# Patient Record
Sex: FEMALE | Race: BLACK OR AFRICAN AMERICAN | URBAN - METROPOLITAN AREA
[De-identification: names, ages, dates, MRNs, and addresses within clinical notes are randomized per-mention and may not be internally consistent; named-entity substitution may affect disease eponyms.]

---

## 2019-01-15 ENCOUNTER — OFFICE VISIT (OUTPATIENT)
Dept: FAMILY MEDICINE CLINIC | Age: 34
End: 2019-01-15

## 2019-01-15 VITALS
OXYGEN SATURATION: 99 % | WEIGHT: 204 LBS | SYSTOLIC BLOOD PRESSURE: 127 MMHG | RESPIRATION RATE: 18 BRPM | TEMPERATURE: 98 F | HEART RATE: 70 BPM | HEIGHT: 62 IN | BODY MASS INDEX: 37.54 KG/M2 | DIASTOLIC BLOOD PRESSURE: 78 MMHG

## 2019-01-15 DIAGNOSIS — R63.5 WEIGHT GAIN: ICD-10-CM

## 2019-01-15 DIAGNOSIS — Z11.3 VENEREAL DISEASE SCREENING: ICD-10-CM

## 2019-01-15 DIAGNOSIS — E66.01 SEVERE OBESITY (HCC): ICD-10-CM

## 2019-01-15 DIAGNOSIS — L68.0 HIRSUTISM: Primary | ICD-10-CM

## 2019-01-15 DIAGNOSIS — Z83.49 FAMILY HISTORY OF THYROID DISEASE IN SISTER: ICD-10-CM

## 2019-01-15 DIAGNOSIS — R10.2 PELVIC PAIN IN FEMALE: ICD-10-CM

## 2019-01-15 DIAGNOSIS — Z13.220 LIPID SCREENING: ICD-10-CM

## 2019-01-15 DIAGNOSIS — L70.9 MILD ACNE: ICD-10-CM

## 2019-01-15 DIAGNOSIS — R61 EXCESSIVE SWEATING: ICD-10-CM

## 2019-01-15 DIAGNOSIS — F41.9 MILD ANXIETY: ICD-10-CM

## 2019-01-15 RX ORDER — BISMUTH SUBSALICYLATE 262 MG
1 TABLET,CHEWABLE ORAL DAILY
COMMUNITY

## 2019-01-15 NOTE — PROGRESS NOTES
[facility] Say[address]. Trisha, [address] 
[phone] Date of visit: 1/15/2019 Subjective:  
  
History obtained from:  the patient. Stephenie Pino is a 29 y.o. female who presents today for new patient, has some concerns, would like labs done Hair growth on face getting worse Has done laser removal before but hair growth worse now Has tried cream from dermatologist 
 
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 
 
Has a 14y daughter and 10y son, sometime after she had daughter she had more problems keeping her weight down Daughter 9-4, son 7-10, did  Not have DM or HTN during pregnancy, vaginal birth Early 25s has been worse, lowest 148, highest 214 Doesn't have periods because she has Mirena Has had it for 9.5 years, on her second one Occasionally will have a very short period, last one was in Dec 
Doesn't check her strings Doesn't have much acne Saw derm 4 year ago, used retin A, can't say it helped that much Has had some on and off pain in left pelvic region Had a pap in Nov, everything was ok Some midline low back pain since August 
She thinks maybe from sitting at work Sweats a lot ion on face Worse with anxiety Has seen doc for anxiety years ago and not bad enough to need a daily medication Since she is aware of it she tries to avoid putting herself into an anxiety-provoking situation Most recent anxiety attack that really bothered her was at [facility], got worked up before she got there Sweating, hands shaking, that was over 5 years ago Able to work, manage a team now so doing better with anxiety Had STD vaginal tests with gyn but would like the blood tests just in case, no particular known exposure Not constipated, takes probiotics Patient Active Problem List  
 Diagnosis Date Noted  Hirsutism 01/15/2019  Excessive sweating 01/15/2019  Weight gain 01/15/2019  Severe obesity ([address] 01/15/2019  Mild acne 01/15/2019  Mild anxiety 01/15/2019 Current Outpatient Medications Medication Sig Dispense Refill  B.infantis-B.ani-B.long-B.bifi (PROBIOTIC 4X) 10-15 mg TbEC Take  by mouth.  multivitamin (ONE A DAY) tablet Take 1 Tab by mouth daily. No Known Allergies History reviewed. No pertinent past medical history. Past Surgical History:  
Procedure Laterality Date  HX CHOLECYSTECTOMY  03/20/2010 Family History Problem Relation Age of Onset  Hypertension Mother  No Known Problems Father  Diabetes Maternal Grandmother  Thyroid Disease Sister Graves disease Social History Tobacco Use  Smoking status: Never Smoker  Smokeless tobacco: Never Used Substance Use Topics  Alcohol use: Yes Comment: occasional  
  
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  
 
Vitals:  
 01/15/19 1436 BP: 127/78 Pulse: 70 Resp: 18 Temp: 98 °F (36.7 °C) TempSrc: Oral  
SpO2: 99% Weight: 204 lb (92.5 kg) Height: 5' 2\" (1.575 m) Body mass index is 37.31 kg/m². General: stated age, obese and in NAD Neck: supple, symmetrical, trachea midline, no adenopathy and thyroid: not enlarged, symmetric, no tenderness/mass/nodules Lungs:  clear to auscultation w/o rales, rhonchi, wheezes w/normal effort and no use of accessory muscles of respiration Heart: regular rate and rhythm, S1, S2 normal, no murmur, click, rub or gallop Abdomen: soft, nontender, no masses Ext:  No edema noted. Lymph: no cervical adenopathy appreciated Skin: moderate facial hair mostly cut short/plucked with small hyperpigmented macules, few closed comedones on cheeks as well. Has some hair midline back and abdomen but very fine Neuro: normal gait Psych: alert and oriented to person, place, time and situation and Speech: appropriate quality, quantity and organization of sentences and normal affect Assessment/Plan: ICD-10-CM ICD-9-CM 1. Hirsutism L68.0 704.1 17-OH PROGESTERONE LCMS TESTOSTERONE, FREE 2. Excessive sweating R61 780.8 17-OH PROGESTERONE LCMS  
   TSH 3RD GENERATION  
   TESTOSTERONE, FREE  
   METABOLIC PANEL, COMPREHENSIVE  
   CBC WITH AUTOMATED DIFF T4, FREE 3. Weight gain R63.5 783.1 TSH 3RD GENERATION  
   T4, FREE 4. Severe obesity (HCC) E66.01 278.01 HEMOGLOBIN A1C WITH EAG  
5. Mild acne L70.9 706.1 6. Mild anxiety F41.9 300.00 TSH 3RD GENERATION  
   T4, FREE 7. Lipid screening Z13.220 V77.91 LIPID PANEL 8. Family history of thyroid disease in sister Z80.51 V23.21 TSH 3RD GENERATION  
   T4, FREE 9. Venereal disease screening Z11.3 V74.5 HIV 1/2 AG/AB, 4TH GENERATION,W RFLX CONFIRM  
   RPR    HEP B SURFACE AB  
   HEP B SURFACE AG  
 10. Pelvic pain in female R10.2 625.9 US PELV NON OBS Orders Placed This Encounter  US PELV NON OBS  17-OH PROGESTERONE LCMS  TSH 3RD GENERATION  
 TESTOSTERONE, FREE  
 METABOLIC PANEL, COMPREHENSIVE  
 CBC WITH AUTOMATED DIFF  
 HEMOGLOBIN A1C WITH EAG  
 LIPID PANEL  
 T4, FREE  
 HIV 1/2 AG/AB, 4TH GENERATION,W RFLX CONFIRM  RPR  
 HEP B SURFACE AB  
 HEP B SURFACE AG  
 B.infantis-B.ani-B.long-B.bifi (PROBIOTIC 4X) 10-15 mg TbEC  multivitamin (ONE A DAY) tablet Will do labs as above Likely does have PCOS as she suspects Also ultrasound for the left pelvic pain, nothing revealed on exam 
Definitely has slow metabolism, has to do very strict diet to lose weight Encouraged her for now to continue to eat relatively low carb, exercise regularly May be that after seeing labs we could offer her med for help with weight control Discussed the diagnosis and plan and she expressed understanding. Follow-up Disposition: 
Return in about 3 months (around 4/15/2019) for Follow up.  
 
Adolfo Emerson MD

## 2019-01-15 NOTE — PROGRESS NOTES
Chief Complaint Patient presents with  New Patient  
  establish care  Other  
  patient reports weight fluctiation and hair growth on face 1. Have you been to the ER, urgent care clinic since your last visit? Hospitalized since your last visit? No 
 
2. Have you seen or consulted any other health care providers outside of the [address] [place] [address] since your last visit? Include any pap smears or colon screening.  No

## 2019-01-15 NOTE — PATIENT INSTRUCTIONS
To schedule an outpatient imaging test (the ultrasound), please call: 
[phone] [facility] scheduling) Learning About Hirsutism What is hirsutism? Hirsutism (say \"HER-clinton-tiz-um\") is excess hair on a woman's face or body. It can run in a woman's family. Most of the time, hirsutism is not caused by a medical problem. But once in a while, hirsutism can be a sign of a health problem. What are the symptoms? Symptoms of hirsutism include extra hair that grows on a woman's face, like it does on a man's face. Or it grows on the body, especially the chest and back. The hair is dark and coarse. What causes hirsutism? Hirsutism is common in women who have polycystic ovary syndrome (PCOS). This syndrome affects your hormone balance, ovulation, and menstrual periods. In some women, hirsutism may be caused by higher-than-normal levels of male hormones called androgens. These hormones are found in both men and women, though men have a lot more of them. In women, androgens are produced by the ovaries or the adrenal glands. But some women with hirsutism don't have PCOS or any other cause that can be found. Their hormone levels are normal, and so are their menstrual cycles. These women may have been born with hair follicles that are more sensitive to androgens. Hirsutism may also occur in some women who have diabetes or who are obese. In rare cases, the ovaries or adrenal glands may have a problem that can cause this hair growth. How is it treated? Your doctor may want to do some tests to find out if a medical problem is causing your excess hair growth. If the cause is not a medical problem, treating it is often a matter of choice. Treatments include: · Birth control pills. This is the most common treatment. Birth control pills contain hormones, so they help balance your body's hormone level. · Antiandrogens. These are prescription medicines that lower the amount of certain hormones in your body. · Topical cream. Your doctor may prescribe a cream that you rub into affected areas to slow hair growth. · Laser hair removal. This procedure uses laser treatments to heat and destroy hair follicles. Hair can be removed for good, but it may take many treatments. · Electrolysis. An electric current is applied to the hair root. This is also permanent, and it may take many treatments. It can also cause scars. If your doctor prescribed medicines, take them as directed. Be safe with medicines. Call your doctor if you think you are having a problem with your medicine. Women who have PCOS and who are overweight may be able to reduce excess hair growth by reaching a healthy weight. Home care Some women prefer to use home treatments for unwanted hair. These treatments include: · Using depilatories. These are over-the-counter creams that dissolve hair. They may irritate the skin. Hair growth returns. · Waxing. This treatment pulls the hair out by the root. Repeated waxing may result in less hair growth, but it can be painful and may irritate the skin. · Shaving. Shaving does not increase hair growth, but it can cause stubble. · Tweezing. This takes a lot of time and can be painful. · Bleaching. Bleaching makes hair lighter and harder to see. New hair that grows in will be its natural color. Follow-up care is a key part of your treatment and safety. Be sure to make and go to all appointments, and call your doctor if you are having problems. It's also a good idea to know your test results and keep a list of the medicines you take. Where can you learn more? Go to http://susi-lilia.info/. Enter B348 in the search box to learn more about \"Learning About Hirsutism. \" Current as of: April 18, 2018 Content Version: 11.8 © 9471-0323 [employer].  Care instructions adapted under license by [employer] (which disclaims liability or warranty for this information). If you have questions about a medical condition or this instruction, always ask your healthcare professional. [place] [address] any warranty or liability for your use of this information.

## 2019-01-18 PROBLEM — E28.2 PCOS (POLYCYSTIC OVARIAN SYNDROME): Status: ACTIVE | Noted: 2019-01-18

## 2019-01-18 LAB
17OHP SERPL-MCNC: 23 NG/DL
ALBUMIN SERPL-MCNC: 4.6 G/DL (ref 3.5–5.5)
ALBUMIN/GLOB SERPL: 1.5 {RATIO} (ref 1.2–2.2)
ALP SERPL-CCNC: 49 IU/L (ref 39–117)
ALT SERPL-CCNC: 15 IU/L (ref 0–32)
AST SERPL-CCNC: 17 IU/L (ref 0–40)
BASOPHILS # BLD AUTO: 0 X10E3/UL (ref 0–0.2)
BASOPHILS NFR BLD AUTO: 1 %
BILIRUB SERPL-MCNC: 0.4 MG/DL (ref 0–1.2)
BUN SERPL-MCNC: 9 MG/DL (ref 6–20)
BUN/CREAT SERPL: 12 (ref 9–23)
CALCIUM SERPL-MCNC: 9.3 MG/DL (ref 8.7–10.2)
CHLORIDE SERPL-SCNC: 103 MMOL/L (ref 96–106)
CHOLEST SERPL-MCNC: 138 MG/DL (ref 100–199)
CO2 SERPL-SCNC: 24 MMOL/L (ref 20–29)
CREAT SERPL-MCNC: 0.78 MG/DL (ref 0.57–1)
EOSINOPHIL # BLD AUTO: 0.2 X10E3/UL (ref 0–0.4)
EOSINOPHIL NFR BLD AUTO: 4 %
ERYTHROCYTE [DISTWIDTH] IN BLOOD BY AUTOMATED COUNT: 13.5 % (ref 12.3–15.4)
EST. AVERAGE GLUCOSE BLD GHB EST-MCNC: 103 MG/DL
GLOBULIN SER CALC-MCNC: 3 G/DL (ref 1.5–4.5)
GLUCOSE SERPL-MCNC: 81 MG/DL (ref 65–99)
HBA1C MFR BLD: 5.2 % (ref 4.8–5.6)
HBV SURFACE AB SER QL: REACTIVE
HBV SURFACE AG SERPL QL IA: NEGATIVE
HCT VFR BLD AUTO: 41.3 % (ref 34–46.6)
HDLC SERPL-MCNC: 63 MG/DL
HGB BLD-MCNC: 13.6 G/DL (ref 11.1–15.9)
HIV 1+2 AB+HIV1 P24 AG SERPL QL IA: NON REACTIVE
IMM GRANULOCYTES # BLD AUTO: 0 X10E3/UL (ref 0–0.1)
IMM GRANULOCYTES NFR BLD AUTO: 0 %
INTERPRETATION, 910389: NORMAL
LDLC SERPL CALC-MCNC: 58 MG/DL (ref 0–99)
LYMPHOCYTES # BLD AUTO: 2 X10E3/UL (ref 0.7–3.1)
LYMPHOCYTES NFR BLD AUTO: 36 %
MCH RBC QN AUTO: 31.3 PG (ref 26.6–33)
MCHC RBC AUTO-ENTMCNC: 32.9 G/DL (ref 31.5–35.7)
MCV RBC AUTO: 95 FL (ref 79–97)
MONOCYTES # BLD AUTO: 0.4 X10E3/UL (ref 0.1–0.9)
MONOCYTES NFR BLD AUTO: 7 %
NEUTROPHILS # BLD AUTO: 2.9 X10E3/UL (ref 1.4–7)
NEUTROPHILS NFR BLD AUTO: 52 %
PLATELET # BLD AUTO: 318 X10E3/UL (ref 150–379)
POTASSIUM SERPL-SCNC: 4.5 MMOL/L (ref 3.5–5.2)
PROT SERPL-MCNC: 7.6 G/DL (ref 6–8.5)
RBC # BLD AUTO: 4.35 X10E6/UL (ref 3.77–5.28)
RPR SER QL: NON REACTIVE
SODIUM SERPL-SCNC: 140 MMOL/L (ref 134–144)
T4 FREE SERPL-MCNC: 1.05 NG/DL (ref 0.82–1.77)
TESTOST FREE SERPL-MCNC: 1 PG/ML (ref 0–4.2)
TRIGL SERPL-MCNC: 87 MG/DL (ref 0–149)
TSH SERPL DL<=0.005 MIU/L-ACNC: 2.69 UIU/ML (ref 0.45–4.5)
VLDLC SERPL CALC-MCNC: 17 MG/DL (ref 5–40)
WBC # BLD AUTO: 5.5 X10E3/UL (ref 3.4–10.8)

## 2019-01-18 RX ORDER — SPIRONOLACTONE 50 MG/1
50 TABLET, FILM COATED ORAL DAILY
Qty: 30 TAB | Refills: 2 | Status: SHIPPED | OUTPATIENT
Start: 2019-01-18

## 2019-01-18 RX ORDER — TRETINOIN 0.5 MG/G
CREAM TOPICAL
Qty: 20 G | Refills: 2 | Status: SHIPPED | OUTPATIENT
Start: 2019-01-18

## 2019-10-07 ENCOUNTER — OFFICE VISIT (OUTPATIENT)
Dept: FAMILY MEDICINE CLINIC | Age: 34
End: 2019-10-07

## 2019-10-07 VITALS
HEIGHT: 62 IN | BODY MASS INDEX: 39.56 KG/M2 | RESPIRATION RATE: 16 BRPM | WEIGHT: 215 LBS | OXYGEN SATURATION: 99 % | DIASTOLIC BLOOD PRESSURE: 78 MMHG | TEMPERATURE: 98.2 F | HEART RATE: 77 BPM | SYSTOLIC BLOOD PRESSURE: 119 MMHG

## 2019-10-07 DIAGNOSIS — T14.8XXA BRUISE: ICD-10-CM

## 2019-10-07 DIAGNOSIS — M79.10 MYALGIA: Primary | ICD-10-CM

## 2019-10-07 DIAGNOSIS — F43.9 STRESS: ICD-10-CM

## 2019-10-07 DIAGNOSIS — R07.89 ATYPICAL CHEST PAIN: ICD-10-CM

## 2019-10-07 RX ORDER — LANOLIN ALCOHOL/MO/W.PET/CERES
1000 CREAM (GRAM) TOPICAL DAILY
COMMUNITY

## 2019-10-07 RX ORDER — GLUCOSAMINE/CHONDR SU A SOD 750-600 MG
TABLET ORAL
COMMUNITY

## 2019-10-07 NOTE — PATIENT INSTRUCTIONS
Musculoskeletal Chest Pain: Care Instructions  Your Care Instructions    Chest pain is not always a sign that something is wrong with your heart or that you have another serious problem. The doctor thinks your chest pain is caused by strained muscles or ligaments, inflamed chest cartilage, or another problem in your chest, rather than by your heart. You may need more tests to find the cause of your chest pain. Follow-up care is a key part of your treatment and safety. Be sure to make and go to all appointments, and call your doctor if you are having problems. It's also a good idea to know your test results and keep a list of the medicines you take. How can you care for yourself at home? · Take pain medicines exactly as directed. ? If the doctor gave you a prescription medicine for pain, take it as prescribed. ? If you are not taking a prescription pain medicine, ask your doctor if you can take an over-the-counter medicine. · Rest and protect the sore area. · Stop, change, or take a break from any activity that may be causing your pain or soreness. · Put ice or a cold pack on the sore area for 10 to 20 minutes at a time. Try to do this every 1 to 2 hours for the next 3 days (when you are awake) or until the swelling goes down. Put a thin cloth between the ice and your skin. · After 2 or 3 days, apply a heating pad set on low or a warm cloth to the area that hurts. Some doctors suggest that you go back and forth between hot and cold. · Do not wrap or tape your ribs for support. This may cause you to take smaller breaths, which could increase your risk of lung problems. · Mentholated creams such as Bengay or Icy Hot may soothe sore muscles. Follow the instructions on the package. · Follow your doctor's instructions for exercising. · Gentle stretching and massage may help you get better faster. Stretch slowly to the point just before pain begins, and hold the stretch for at least 15 to 30 seconds.  Do this 3 or 4 times a day. Stretch just after you have applied heat. · As your pain gets better, slowly return to your normal activities. Any increased pain may be a sign that you need to rest a while longer. When should you call for help? Call 911 anytime you think you may need emergency care. For example, call if:    · You have chest pain or pressure. This may occur with:  ? Sweating. ? Shortness of breath. ? Nausea or vomiting. ? Pain that spreads from the chest to the neck, jaw, or one or both shoulders or arms. ? Dizziness or lightheadedness. ? A fast or uneven pulse. After calling 911, chew 1 adult-strength aspirin. Wait for an ambulance. Do not try to drive yourself.     · You have sudden chest pain and shortness of breath, or you cough up blood.    Call your doctor now or seek immediate medical care if:    · You have any trouble breathing.     · Your chest pain gets worse.     · Your chest pain occurs consistently with exercise and is relieved by rest.    Watch closely for changes in your health, and be sure to contact your doctor if:    · Your chest pain does not get better after 1 week. Where can you learn more? Go to http://susi-lilia.info/. Enter V293 in the search box to learn more about \"Musculoskeletal Chest Pain: Care Instructions. \"  Current as of: June 26, 2019  Content Version: 12.2  © 3204-7741 [employer]. Care instructions adapted under license by [employer] (which disclaims liability or warranty for this information). If you have questions about a medical condition or this instruction, always ask your healthcare professional. [place] [address] any warranty or liability for your use of this information. Learning About Mindfulness for Stress  What are mindfulness and stress? Stress is what you feel when you have to handle more than you are used to. A lot of things can cause stress.  You may feel stress when you go on a job interview, take a test, or run a race. This kind of short-term stress is normal and even useful. It can help you if you need to work hard or react quickly. Stress also can last a long time. Long-term stress is caused by stressful situations or events. Examples of long-term stress include long-term health problems, ongoing problems at work, and conflicts in your family. Long-term stress can harm your health. Mindfulness is a focus only on things happening in the present moment. It's a process of purposefully paying attention to and being aware of your surroundings, your emotions, your thoughts, and how your body feels. You are aware of these things, but you aren't judging these experiences as \"good\" or \"bad. \" Mindfulness can help you learn to calm your mind and body to help you cope with illness, pain, and stress. How does mindfulness help to relieve stress? Mindfulness can help quiet your mind and relax your body. Studies show that it can help some people sleep better, feel less anxious, and bring their blood pressure down. And it's been shown to help some people live and cope better with certain health problems like heart disease, depression, chronic pain, and cancer. How do you practice mindfulness? To be mindful is to pay attention, to be present, and to be accepting. · When you're mindful, you do just one thing and you pay close attention to that one thing. For example, you may sit quietly and notice your emotions or how your food tastes and smells. · When you're present, you focus on the things that are happening right now. You let go of your thoughts about the past and the future. When you dwell on the past or the future, you miss moments that can heal and strengthen you. You may miss moments like hearing a child laugh or seeing a friendly face when you think you're all alone. · When you're accepting, you don't  the present moment.  Instead you accept your thoughts and feelings as they come.  You can practice anytime, anywhere, and in any way you choose. You can practice in many ways. Here are a few ideas:  · While doing your chores, like washing the dishes, let your mind focus on what's in your hand. What does the dish feel like? Is the water warm or cold? · Go outside and take a few deep breaths. What is the air like? Is it warm or cold? · When you can, take some time at the start of your day to sit alone and think. · Take a slow walk by yourself. Count your steps while you breathe in and out. · Try yoga breathing exercises, stretches, and poses to strengthen and relax your muscles. · At work, if you can, try to stop for a few moments each hour. Note how your body feels. Let yourself regroup and let your mind settle before you return to what you were doing. · If you struggle with anxiety or \"worry thoughts,\" imagine your mind as a blue dontae and your worry thoughts as clouds. Now imagine those worry thoughts floating across your mind's dontae. Just let them pass by as you watch. Follow-up care is a key part of your treatment and safety. Be sure to make and go to all appointments, and call your doctor if you are having problems. It's also a good idea to know your test results and keep a list of the medicines you take. Where can you learn more? Go to http://susi-lilia.info/. Enter A636 in the search box to learn more about \"Learning About Mindfulness for Stress. \"  Current as of: April 7, 2019  Content Version: 12.2  © 7238-8043 [employer]. Care instructions adapted under license by [employer] (which disclaims liability or warranty for this information). If you have questions about a medical condition or this instruction, always ask your healthcare professional. [address] any warranty or liability for your use of this information.

## 2019-10-07 NOTE — PROGRESS NOTES
[address] [facility] Note      Subjective:     Chief Complaint   Patient presents with    Other     pain on left side of the body x Jan 2019. Comes and goes       [place] Body is a 36y.o. year old female who presents for evaluation of the following:      Aching Left Side  Onset > 6 months ago  Intermittent, most frequent over past 1 week  Shoulder rknee, pelvis, leftanterior chest, under left breast  Character aching  Endorses work stress  Tx: None  Jose Antonio fever  PRISCILLA: 3  3 most recent PHQ Screens 10/7/2019   Little interest or pleasure in doing things Not at all   Feeling down, depressed, irritable, or hopeless Not at all   Total Score PHQ 2 0   Trouble falling or staying asleep, or sleeping too much Several days   Feeling tired or having little energy Several days   Poor appetite, weight loss, or overeating Not at all   Feeling bad about yourself - or that you are a failure or have let yourself or your family down Not at all   Trouble concentrating on things such as school, work, reading, or watching TV Several days   Moving or speaking so slowly that other people could have noticed; or the opposite being so fidgety that others notice Several days   Thoughts of being better off dead, or hurting yourself in some way Not at all   PHQ 9 Score 4         Chest Pain:   Character \"not sharp\", aching  - on left anterior  Not tender, no lumps. No relation to exertion  Denies shortness of breath, diaphoresis    Bruises   May 2019  Bruise of leg resolved on its own  No recurrence          Review of Systems   Pertinent positives and negative per HPI. All other systems  reviewed are negative for a Comprehensive ROS (10+). History reviewed. No pertinent past medical history.      Social History     Socioeconomic History    Marital status: SINGLE     Spouse name: Not on file    Number of children: Not on file    Years of education: Not on file    Highest education level: Not on file Occupational History    Not on file   Social Needs    Financial resource strain: Not on file    Food insecurity:     Worry: Not on file     Inability: Not on file    Transportation needs:     Medical: Not on file     Non-medical: Not on file   Tobacco Use    Smoking status: Never Smoker    Smokeless tobacco: Never Used   Substance and Sexual Activity    Alcohol use: Yes     Comment: occasional    Drug use: No    Sexual activity: Yes     Birth control/protection: IUD   Lifestyle    Physical activity:     Days per week: Not on file     Minutes per session: Not on file    Stress: Not on file   Relationships    Social connections:     Talks on phone: Not on file     Gets together: Not on file     Attends Yazidi service: Not on file     Active member of club or organization: Not on file     Attends meetings of clubs or organizations: Not on file     Relationship status: Not on file    Intimate partner violence:     Fear of current or ex partner: Not on file     Emotionally abused: Not on file     Physically abused: Not on file     Forced sexual activity: Not on file   Other Topics Concern    Not on file   Social History Narrative    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  Biotin 2,500 mcg cap Take  by mouth.  tretinoin (RETIN-A) 0.05 % topical cream Apply  to affected area nightly.  multivitamin (ONE A DAY) tablet Take 1 Tab by mouth daily.  spironolactone (ALDACTONE) 50 mg tablet Take 1 Tab by mouth daily.  B.infantis-B.ani-B.long-B.bifi (PROBIOTIC 4X) 10-15 mg TbEC Take  by mouth. No current facility-administered medications for this visit. Objective:     Vitals:    10/07/19 1430   BP: 119/78   Pulse: 77   Resp: 16   Temp: 98.2 °F (36.8 °C)   TempSrc: Oral   SpO2: 99%   Weight: 215 lb (97.5 kg)   Height: 5' 2\" (1.575 m)       Physical Examination:  General: Alert, cooperative, no distress, appears stated age. Obese  Eyes: Conjunctivae clear. PERRL, EOMs intact. Ears: Normal external ear canals both ears. Nose: Nares normal.   Mouth/Throat: Lips, mucosa, and tongue normal.   Neck: Supple, symmetrical, trachea midline, no adenopathy. No thyroid enlargement/tenderness/nodules  Respiratory: Breathing comfortably, in no acute respiratory distress. Clear to auscultation bilaterally. Normal inspiratory and expiratory ratio. Cardiovascular: Regular rate and rhythm, S1, S2 normal, no murmur, click, rub or gallop.   -Extremities no edema. Pulses 2+ and symmetric radial   Abdomen: Soft, non-tender, not distended. Bowel sounds normal.   Breasts: breasts appear normal, no suspicious masses, no skin or nipple changes or axillary nodes. MSK: Extremities normal, atraumatic, no effusion. Gait steady and unassisted. Back symmetric, no curvature. ROM normal. No CVA tenderness. - left anterior chest wall tenderness. Skin: Skin color, texture, turgor normal. No rashes or lesions on exposed skin. Lymph nodes: Cervical, supraclavicular nodes normal.  Neurologic: CNII-XII intact. Strength 5/5 grossly. Sensation and reflexes normal throughout. Psychiatric: Affect appropriate. Mood euthymic. Thoughts logical. Speech volume and speed normal      No visits with results within 3 Month(s) from this visit.    Latest known visit with results is:   Office Visit on 01/15/2019   Component Date Value Ref Range Status    17-OH Progesterone 01/15/2019 23  ng/dL Final    Comment:                           Adult Female                             Follicular        15 -  70                             Luteal            35 - 290  This test was developed and its performance characteristics  determined by [facility]. It has not been cleared or approved  by the Food and Drug Administration.  TSH 01/15/2019 2.690  0.450 - 4.500 uIU/mL Final    Free testosterone (Direct) 01/15/2019 1.0  0.0 - 4.2 pg/mL Final    Glucose 01/15/2019 81  65 - 99 mg/dL Final    BUN 01/15/2019 9  6 - 20 mg/dL Final    Creatinine 01/15/2019 0.78  0.57 - 1.00 mg/dL Final    GFR est non-AA 01/15/2019 101  >59 mL/min/1.73 Final    GFR est AA 01/15/2019 116  >59 mL/min/1.73 Final    BUN/Creatinine ratio 01/15/2019 12  9 - 23 Final    Sodium 01/15/2019 140  134 - 144 mmol/L Final    Potassium 01/15/2019 4.5  3.5 - 5.2 mmol/L Final    Chloride 01/15/2019 103  96 - 106 mmol/L Final    CO2 01/15/2019 24  20 - 29 mmol/L Final    Calcium 01/15/2019 9.3  8.7 - 10.2 mg/dL Final    Protein, total 01/15/2019 7.6  6.0 - 8.5 g/dL Final    Albumin 01/15/2019 4.6  3.5 - 5.5 g/dL Final    GLOBULIN, TOTAL 01/15/2019 3.0  1.5 - 4.5 g/dL Final    A-G Ratio 01/15/2019 1.5  1.2 - 2.2 Final    Bilirubin, total 01/15/2019 0.4  0.0 - 1.2 mg/dL Final    Alk. phosphatase 01/15/2019 49  39 - 117 IU/L Final    AST (SGOT) 01/15/2019 17  0 - 40 IU/L Final    ALT (SGPT) 01/15/2019 15  0 - 32 IU/L Final    WBC 01/15/2019 5.5  3.4 - 10.8 x10E3/uL Final    RBC 01/15/2019 4.35  3.77 - 5.28 x10E6/uL Final    HGB 01/15/2019 13.6  11.1 - 15.9 g/dL Final    HCT 01/15/2019 41.3  34.0 - 46.6 % Final    MCV 01/15/2019 95  79 - 97 fL Final    MCH 01/15/2019 31.3  26.6 - 33.0 pg Final    MCHC 01/15/2019 32.9  31.5 - 35.7 g/dL Final    RDW 01/15/2019 13.5  12.3 - 15.4 % Final    PLATELET 03/08/2737 159  150 - 379 x10E3/uL Final    NEUTROPHILS 01/15/2019 52  Not Estab. % Final    Lymphocytes 01/15/2019 36  Not Estab. % Final    MONOCYTES 01/15/2019 7  Not Estab. % Final    EOSINOPHILS 01/15/2019 4  Not Estab. % Final    BASOPHILS 01/15/2019 1  Not Estab. % Final    ABS.  NEUTROPHILS 01/15/2019 2.9  1.4 - 7.0 x10E3/uL Final    Abs Lymphocytes 01/15/2019 2.0  0.7 - 3.1 x10E3/uL Final    ABS. MONOCYTES 01/15/2019 0.4  0.1 - 0.9 x10E3/uL Final    ABS. EOSINOPHILS 01/15/2019 0.2  0.0 - 0.4 x10E3/uL Final    ABS. BASOPHILS 01/15/2019 0.0  0.0 - 0.2 x10E3/uL Final    IMMATURE GRANULOCYTES 01/15/2019 0  Not Estab. % Final    ABS. IMM. GRANS. 01/15/2019 0.0  0.0 - 0.1 x10E3/uL Final    Hemoglobin A1c 01/15/2019 5.2  4.8 - 5.6 % Final    Comment:          Prediabetes: 5.7 - 6.4           Diabetes: >6.4           Glycemic control for adults with diabetes: <7.0      Estimated average glucose 01/15/2019 103  mg/dL Final    Cholesterol, total 01/15/2019 138  100 - 199 mg/dL Final    Triglyceride 01/15/2019 87  0 - 149 mg/dL Final    HDL Cholesterol 01/15/2019 63  >39 mg/dL Final    VLDL, calculated 01/15/2019 17  5 - 40 mg/dL Final    LDL, calculated 01/15/2019 58  0 - 99 mg/dL Final    T4, Free 01/15/2019 1.05  0.82 - 1.77 ng/dL Final    HIV SCREEN 4TH GENERATION WRFX 01/15/2019 Non Reactive  Non Reactive Final    RPR 01/15/2019 Non Reactive  Non Reactive Final    HEP B SURFACE AB, QUAL 01/15/2019 Reactive   Final    Comment:               Non Reactive: Inconsistent with immunity,                              less than 10 mIU/mL                Reactive:     Consistent with immunity,                              greater than 9.9 mIU/mL      Hep B surface Ag screen 01/15/2019 Negative  Negative Final    INTERPRETATION 01/15/2019 Note   Final    Supplemental report is available. Assessment/ Plan:   Diagnoses and all orders for this visit:    1. Myalgia  -     CK  -     BASIC METABOLIC PANEL (7)    2. Atypical chest pain  -     AMB POC EKG ROUTINE W/ 12 LEADS, INTER & REP    3. Bruise    4. Stress        PMH reviewed per orders. Labs to eval etiology of chronic/acute concerns. Chest pain muscular in origin along with other intermittent myalgia of multiple locations.  Mild pain. likel triggered by stress and sedentary lifestyle. EKG reassuring, NSR HR 69. Exercises and NSAID for MSK concern. Encouraged counseling for work stress. Educated patient on red flag symptoms to warrant return to clinic or emergency room visit. I have discussed the diagnosis with the patient and the intended plan as seen in the above orders. The patient has been offered or received an after-visit summary and questions were answered concerning future plans. I have discussed medication side effects and warnings with the patient as well. Follow-up and Dispositions    · Return if symptoms worsen or fail to improve.          Signed,    Karolina Raphael MD  10/7/2019

## 2019-10-07 NOTE — PROGRESS NOTES
Chief Complaint   Patient presents with    Other     pain on left side of the body x Jan 2019. Comes and goes       1. Have you been to the ER, urgent care clinic since your last visit? Hospitalized since your last visit? Yes a Urgent care in [place] 1 month ago for sinus infection     2. Have you seen or consulted any other health care providers outside of the [address] [place] [address] since your last visit? Include any pap smears or colon screening.  No

## 2019-10-08 LAB
BUN SERPL-MCNC: 6 MG/DL (ref 6–20)
BUN/CREAT SERPL: 7 (ref 9–23)
CHLORIDE SERPL-SCNC: 102 MMOL/L (ref 96–106)
CK SERPL-CCNC: 131 U/L (ref 24–173)
CO2 SERPL-SCNC: 24 MMOL/L (ref 20–29)
CREAT SERPL-MCNC: 0.82 MG/DL (ref 0.57–1)
GLUCOSE SERPL-MCNC: 98 MG/DL (ref 65–99)
POTASSIUM SERPL-SCNC: 4.2 MMOL/L (ref 3.5–5.2)
SODIUM SERPL-SCNC: 138 MMOL/L (ref 134–144)

## 2019-10-10 NOTE — PROGRESS NOTES
Call placed to patient at [phone]. Patients name and  verified. reviewed recent labs with patient. She was appreciative of call and verbalized understanding.